# Patient Record
(demographics unavailable — no encounter records)

---

## 2024-12-12 NOTE — HISTORY OF PRESENT ILLNESS
[de-identified] : 28-year-old female presents for evaluation of left knee pain s/p trip and fall on 11/29/24. She landed directly on the knee. She went to urgent care and had x-rays which were reportedly negative. She complains of intermittent aching pain over the anterior knee worse with stair climbing, getting out of a seated position. She has been icing and taking ASA with some relief. She feels that pain has slightly improved but is concerned that she still has some swelling and bruising over the anterior knee. No prior knee injuries.

## 2024-12-12 NOTE — PHYSICAL EXAM
[Slightly Antalgic] : slightly antalgic [LE] : Sensory: Intact in bilateral lower extremities [DP] : dorsalis pedis 2+ and symmetric bilaterally [PT] : posterior tibial 2+ and symmetric bilaterally [Normal] : Alert and in no acute distress [Poor Appearance] : well-appearing [Acute Distress] : not in acute distress [Obese] : not obese [de-identified] : The patient has no respiratory distress. Mood and affect are normal. The patient is alert and oriented to person, place and time. There is no pain with active or passive motion of the hips.  There is no tenderness of either hip.  Examination of the knees demonstrates tenderness on the anterior aspect of the left knee.  There is an area of ecchymosis distal to the patella.  Quadriceps and hamstring function are intact.  There is no instability of collateral or cruciate ligaments.  Range of motion 0 to 120 degrees of both knees.  Shane test is negative.  The calves are soft and nontender.  The skin is intact.  There is no lymphedema. [de-identified] : Procedure was performed at the [facility] EXAM: KNEE LEFT PROCEDURE DATE: 11/30/2024 INTERPRETATION: INDICATION: Arthralgia of left knee  COMPARISON: None available  FINDING: Three views of the left knee are limited by lack of a true lateral. Within this limitation, no fractures seen. Small knee effusion. Soft tissues are unremarkable.  IMPRESSION: No fracture. Small knee effusion.  --- End of Report ---  ROQUE WOODY MD; Attending Radiologist This document has been electronically signed. Nov 30 2024 1:18PM   [facility] x-rays of the left knee taken today demonstrate no fracture or dislocation.

## 2024-12-12 NOTE — DISCUSSION/SUMMARY
[de-identified] : Patient sustained a contusion to her left knee.  I have discussed the pathology, natural history and treatment options with her.  She may bear weight to tolerance.  She will work on range of motion exercises.  I have started her on a home quadricep strengthening program.  If symptomatic in 3 weeks she can be reevaluated.